# Patient Record
Sex: FEMALE | Race: BLACK OR AFRICAN AMERICAN | NOT HISPANIC OR LATINO | Employment: FULL TIME | URBAN - METROPOLITAN AREA
[De-identification: names, ages, dates, MRNs, and addresses within clinical notes are randomized per-mention and may not be internally consistent; named-entity substitution may affect disease eponyms.]

---

## 2022-04-11 ENCOUNTER — HISTORICAL (OUTPATIENT)
Dept: ADMINISTRATIVE | Facility: HOSPITAL | Age: 22
End: 2022-04-11

## 2022-04-29 VITALS — DIASTOLIC BLOOD PRESSURE: 70 MMHG | SYSTOLIC BLOOD PRESSURE: 112 MMHG | OXYGEN SATURATION: 97 %

## 2022-07-06 ENCOUNTER — HOSPITAL ENCOUNTER (EMERGENCY)
Facility: HOSPITAL | Age: 22
Discharge: HOME | End: 2022-07-07
Attending: EMERGENCY MEDICINE
Payer: COMMERCIAL

## 2022-07-06 DIAGNOSIS — R10.31 RIGHT LOWER QUADRANT ABDOMINAL PAIN: Primary | ICD-10-CM

## 2022-07-06 LAB
ALBUMIN SERPL-MCNC: 4.2 GM/DL (ref 3.5–5)
ALBUMIN/GLOB SERPL: 1.6 RATIO (ref 1.1–2)
ALP SERPL-CCNC: 84 UNIT/L (ref 40–150)
ALT SERPL-CCNC: 16 UNIT/L (ref 0–55)
AST SERPL-CCNC: 20 UNIT/L (ref 5–34)
B-HCG UR QL: NEGATIVE
BASOPHILS # BLD AUTO: 0.06 X10(3)/MCL (ref 0–0.2)
BASOPHILS NFR BLD AUTO: 0.5 %
BILIRUBIN DIRECT+TOT PNL SERPL-MCNC: 0.3 MG/DL
BUN SERPL-MCNC: 8 MG/DL (ref 7–18.7)
CALCIUM SERPL-MCNC: 9.6 MG/DL (ref 8.4–10.2)
CHLORIDE SERPL-SCNC: 107 MMOL/L (ref 98–107)
CO2 SERPL-SCNC: 25 MMOL/L (ref 22–29)
CREAT SERPL-MCNC: 0.89 MG/DL (ref 0.55–1.02)
CTP QC/QA: YES
EOSINOPHIL # BLD AUTO: 0.29 X10(3)/MCL (ref 0–0.9)
EOSINOPHIL NFR BLD AUTO: 2.3 %
ERYTHROCYTE [DISTWIDTH] IN BLOOD BY AUTOMATED COUNT: 12.1 % (ref 11.5–17)
GLOBULIN SER-MCNC: 2.7 GM/DL (ref 2.4–3.5)
GLUCOSE SERPL-MCNC: 120 MG/DL (ref 74–100)
HCT VFR BLD AUTO: 40.8 % (ref 37–47)
HGB BLD-MCNC: 13.1 GM/DL (ref 12–16)
IMM GRANULOCYTES # BLD AUTO: 0.05 X10(3)/MCL (ref 0–0.04)
IMM GRANULOCYTES NFR BLD AUTO: 0.4 %
LIPASE SERPL-CCNC: 11 U/L
LYMPHOCYTES # BLD AUTO: 2.12 X10(3)/MCL (ref 0.6–4.6)
LYMPHOCYTES NFR BLD AUTO: 16.8 %
MCH RBC QN AUTO: 29 PG (ref 27–31)
MCHC RBC AUTO-ENTMCNC: 32.1 MG/DL (ref 33–36)
MCV RBC AUTO: 90.5 FL (ref 80–94)
MONOCYTES # BLD AUTO: 0.9 X10(3)/MCL (ref 0.1–1.3)
MONOCYTES NFR BLD AUTO: 7.1 %
NEUTROPHILS # BLD AUTO: 9.2 X10(3)/MCL (ref 2.1–9.2)
NEUTROPHILS NFR BLD AUTO: 72.9 %
NRBC BLD AUTO-RTO: 0 %
PLATELET # BLD AUTO: 320 X10(3)/MCL (ref 130–400)
PMV BLD AUTO: 10.4 FL (ref 7.4–10.4)
POTASSIUM SERPL-SCNC: 4 MMOL/L (ref 3.5–5.1)
PROT SERPL-MCNC: 6.9 GM/DL (ref 6.4–8.3)
RBC # BLD AUTO: 4.51 X10(6)/MCL (ref 4.2–5.4)
SODIUM SERPL-SCNC: 140 MMOL/L (ref 136–145)
WBC # SPEC AUTO: 12.6 X10(3)/MCL (ref 4.5–11.5)

## 2022-07-06 PROCEDURE — 85025 COMPLETE CBC W/AUTO DIFF WBC: CPT | Performed by: EMERGENCY MEDICINE

## 2022-07-06 PROCEDURE — 96374 THER/PROPH/DIAG INJ IV PUSH: CPT | Mod: 59

## 2022-07-06 PROCEDURE — 80053 COMPREHEN METABOLIC PANEL: CPT | Performed by: EMERGENCY MEDICINE

## 2022-07-06 PROCEDURE — 81001 URINALYSIS AUTO W/SCOPE: CPT | Performed by: EMERGENCY MEDICINE

## 2022-07-06 PROCEDURE — 99285 EMERGENCY DEPT VISIT HI MDM: CPT | Mod: 25

## 2022-07-06 PROCEDURE — 25000003 PHARM REV CODE 250: Performed by: EMERGENCY MEDICINE

## 2022-07-06 PROCEDURE — 36415 COLL VENOUS BLD VENIPUNCTURE: CPT | Performed by: EMERGENCY MEDICINE

## 2022-07-06 PROCEDURE — 81025 URINE PREGNANCY TEST: CPT | Performed by: PHYSICIAN ASSISTANT

## 2022-07-06 PROCEDURE — 81025 URINE PREGNANCY TEST: CPT | Performed by: EMERGENCY MEDICINE

## 2022-07-06 PROCEDURE — 81003 URINALYSIS AUTO W/O SCOPE: CPT | Performed by: EMERGENCY MEDICINE

## 2022-07-06 PROCEDURE — 63600175 PHARM REV CODE 636 W HCPCS: Performed by: PHYSICIAN ASSISTANT

## 2022-07-06 PROCEDURE — 83690 ASSAY OF LIPASE: CPT | Performed by: EMERGENCY MEDICINE

## 2022-07-06 PROCEDURE — 25500020 PHARM REV CODE 255: Performed by: EMERGENCY MEDICINE

## 2022-07-06 RX ORDER — KETOROLAC TROMETHAMINE 30 MG/ML
30 INJECTION, SOLUTION INTRAMUSCULAR; INTRAVENOUS
Status: COMPLETED | OUTPATIENT
Start: 2022-07-06 | End: 2022-07-06

## 2022-07-06 RX ORDER — SODIUM CHLORIDE 9 MG/ML
1000 INJECTION, SOLUTION INTRAVENOUS
Status: COMPLETED | OUTPATIENT
Start: 2022-07-06 | End: 2022-07-06

## 2022-07-06 RX ORDER — SODIUM CHLORIDE 0.9 % (FLUSH) 0.9 %
10 SYRINGE (ML) INJECTION
Status: DISCONTINUED | OUTPATIENT
Start: 2022-07-06 | End: 2022-07-07 | Stop reason: HOSPADM

## 2022-07-06 RX ADMIN — KETOROLAC TROMETHAMINE 30 MG: 30 INJECTION, SOLUTION INTRAMUSCULAR at 09:07

## 2022-07-06 RX ADMIN — IOPAMIDOL 100 ML: 755 INJECTION, SOLUTION INTRAVENOUS at 10:07

## 2022-07-06 RX ADMIN — SODIUM CHLORIDE 1000 ML: 9 INJECTION, SOLUTION INTRAVENOUS at 10:07

## 2022-07-07 VITALS
TEMPERATURE: 97 F | RESPIRATION RATE: 16 BRPM | OXYGEN SATURATION: 98 % | HEART RATE: 80 BPM | SYSTOLIC BLOOD PRESSURE: 117 MMHG | HEIGHT: 62 IN | DIASTOLIC BLOOD PRESSURE: 70 MMHG

## 2022-07-07 LAB
APPEARANCE UR: CLEAR
B-HCG SERPL QL: NEGATIVE
BACTERIA #/AREA URNS AUTO: ABNORMAL /HPF
BILIRUB UR QL STRIP.AUTO: NEGATIVE MG/DL
COLOR UR AUTO: YELLOW
GLUCOSE UR QL STRIP.AUTO: NEGATIVE MG/DL
KETONES UR QL STRIP.AUTO: NEGATIVE MG/DL
LEUKOCYTE ESTERASE UR QL STRIP.AUTO: NEGATIVE UNIT/L
NITRITE UR QL STRIP.AUTO: NEGATIVE
PH UR STRIP.AUTO: 7.5 [PH]
PROT UR QL STRIP.AUTO: ABNORMAL MG/DL
RBC #/AREA URNS AUTO: 10 /HPF
RBC #/AREA URNS AUTO: ABNORMAL /HPF
RBC UR QL AUTO: ABNORMAL UNIT/L
SP GR UR STRIP.AUTO: >=1.04 (ref 1–1.03)
SQUAMOUS #/AREA URNS AUTO: 9 /HPF
SQUAMOUS #/AREA URNS AUTO: ABNORMAL /HPF
UROBILINOGEN UR STRIP-ACNC: 1 MG/DL
WBC #/AREA URNS AUTO: 5 /HPF
WBC #/AREA URNS AUTO: ABNORMAL /HPF

## 2022-07-07 RX ORDER — ONDANSETRON 4 MG/1
4 TABLET, ORALLY DISINTEGRATING ORAL EVERY 6 HOURS PRN
Qty: 20 TABLET | Refills: 0 | Status: SHIPPED | OUTPATIENT
Start: 2022-07-07 | End: 2022-07-07 | Stop reason: SDUPTHER

## 2022-07-07 RX ORDER — ONDANSETRON 4 MG/1
4 TABLET, ORALLY DISINTEGRATING ORAL EVERY 6 HOURS PRN
Qty: 20 TABLET | Refills: 0 | Status: SHIPPED | OUTPATIENT
Start: 2022-07-07 | End: 2022-07-12

## 2022-07-07 NOTE — ED PROVIDER NOTES
Encounter Date: 7/6/2022    SCRIBE #1 NOTE: I, Rosa Maria Alicia, am scribing for, and in the presence of,  Yahaira Hebert. I have scribed the entire note.       History     Chief Complaint   Patient presents with    Abdominal Pain     C/o rlq abd pain times 2 hours.  Denies dysuria. N/v times 1     21 y/o female presents to the ED complaining of right lower abdominal pain that started several hours ago. She also c/o nausea and vomiting. Pt denies any fevers, diarrhea, dysuria, or hematuria.  She is currently on her menstrual cycle.  She has not taken anything for the pain.    The history is provided by the patient.   Abdominal Pain  The current episode started several hours ago. The abdominal pain radiates to the RLQ and right flank. The other symptoms of the illness include nausea and vomiting. The other symptoms of the illness do not include fever, shortness of breath, diarrhea or dysuria.   The patient states that she believes she is currently not pregnant. Symptoms associated with the illness do not include constipation, hematuria, frequency or back pain.     Review of patient's allergies indicates:  No Known Allergies  History reviewed. No pertinent past medical history.  History reviewed. No pertinent surgical history.  No family history on file.     Review of Systems   Constitutional: Negative for fever.   HENT: Negative for rhinorrhea.    Respiratory: Negative for cough and shortness of breath.    Cardiovascular: Negative for chest pain.   Gastrointestinal: Positive for abdominal pain, nausea and vomiting. Negative for blood in stool, constipation and diarrhea.   Genitourinary: Negative for difficulty urinating, dysuria, frequency and hematuria.   Musculoskeletal: Negative for back pain and neck pain.   Skin: Negative for rash.   Neurological: Negative for weakness, numbness and headaches.       Physical Exam     Initial Vitals [07/06/22 1945]   BP Pulse Resp Temp SpO2   128/74 72 16 97 °F (36.1 °C) 98 %      MAP        --         Physical Exam    Nursing note and vitals reviewed.  Constitutional: She appears well-developed and well-nourished. She is not diaphoretic. No distress.   Limited exam in wheelchair as there are currently no beds in the ED due to boarding   HENT:   Head: Normocephalic and atraumatic.   Mouth/Throat: Oropharynx is clear and moist.   Eyes: Conjunctivae and EOM are normal.   Neck: Neck supple.   Normal range of motion.  Cardiovascular: Normal rate and regular rhythm.   Pulmonary/Chest: No respiratory distress.   Abdominal: Abdomen is soft. Bowel sounds are normal. She exhibits no distension. There is abdominal tenderness (RLQ, right flank). There is no rebound and no guarding.   Musculoskeletal:         General: No edema. Normal range of motion.      Cervical back: Normal range of motion and neck supple.      Lumbar back: Normal range of motion.     Neurological: She is alert and oriented to person, place, and time. She has normal strength.   Skin: Skin is warm, dry and intact. Capillary refill takes less than 2 seconds.   Psychiatric: She has a normal mood and affect.         ED Course   Procedures  Labs Reviewed   URINALYSIS, REFLEX TO URINE CULTURE - Abnormal; Notable for the following components:       Result Value    Specific Gravity, UA >=1.040 (*)     Protein, UA Trace (*)     Blood, UA Trace (*)     All other components within normal limits   COMPREHENSIVE METABOLIC PANEL - Abnormal; Notable for the following components:    Glucose Level 120 (*)     All other components within normal limits   CBC WITH DIFFERENTIAL - Abnormal; Notable for the following components:    WBC 12.6 (*)     MCHC 32.1 (*)     IG# 0.05 (*)     All other components within normal limits   URINALYSIS, MICROSCOPIC - Abnormal; Notable for the following components:    RBC, UA 10 (*)     Squamous Epithelial Cells, UA 9 (*)     Squamous Epithelial Cells, UA 5-10 (*)     All other components within normal limits   LIPASE - Normal    PREGNANCY TEST, URINE RAPID - Normal   CBC W/ AUTO DIFFERENTIAL    Narrative:     The following orders were created for panel order CBC auto differential.  Procedure                               Abnormality         Status                     ---------                               -----------         ------                     CBC with Differential[[id number]]        Abnormal            Final result                 Please view results for these tests on the individual orders.   POCT URINE PREGNANCY          Imaging Results          CT Abdomen Pelvis With Contrast (Final result)  Result time 07/06/22 22:28:57    Final result by Daniele Wagoner MD (07/06/22 22:28:57)                 Impression:      No acute abnormalities are seen      Electronically signed by: Daniele Wagoner MD  Date:    07/06/2022  Time:    22:28             Narrative:    EXAMINATION:  CT ABDOMEN PELVIS WITH CONTRAST    CLINICAL HISTORY:  RLQ abdominal pain (Age >= 14y);    TECHNIQUE:  Low dose axial images, sagittal and coronal reformations were obtained from the lung bases to the pubic symphysis following the IV administration of 100 mL of Isovue 370 no oral contrast is demonstrated.    Automatic exposure control (AEC) was utilized for dose reduction.    Dose: 437 mGycm    COMPARISON:  None.    FINDINGS:  Lung bases are clear.  Liver appears normal.  Spleen appears normal.  Pancreas appears normal.  Biliary system appears normal.  The adrenals are not enlarged.  Kidneys appear normal.  Aorta shows no evidence of an aneurysm.  Uterus appears normal.  The appendix appears normal.                                 Medications   ketorolac injection 30 mg (30 mg Intravenous Given 7/6/22 2130)   iopamidoL (ISOVUE-370) injection 100 mL (100 mLs Intravenous Given 7/6/22 2213)   0.9%  NaCl infusion (1,000 mLs Intravenous New Bag 7/6/22 2250)     Medical Decision Making:   Initial Assessment:   22-year-old female, UPT negative, here for right lower  quadrant/right flank pain that began earlier tonight.  She had 1 episode of nausea with vomiting, no changes in bowel movement.  No fever or urinary symptoms.  She is tender in the right lower quadrant and right flank on limited abdominal exam.  Labs remarkable for leukocytosis.  I will obtain CT scan of the abdomen and pelvis with contrast to further assess for appendicitis and other etiologies of her pain.  Reassess  Differential Diagnosis:   Renal stone, appendicitis, gastritis, biliary colic, and others  Clinical Tests:   Lab Tests: Ordered and Reviewed  Radiological Study: Ordered and Reviewed          Scribe Attestation:   Scribe #1: I performed the above scribed service and the documentation accurately describes the services I performed. I attest to the accuracy of the note.    Attending Attestation:           Physician Attestation for Scribe:  Physician Attestation Statement for Scribe #1: I, reviewed documentation, as scribed by Rosa Maria Alicia in my presence, and it is both accurate and complete.             ED Course as of 07/07/22 0629   Thu Jul 07, 2022   0027 Patient is resting comfortably, feeling improved after IV fluids and Toradol.  UA is still pending, CT scan of the abdomen and pelvis negative for acute appendicitis or other intra-abdominal or pelvic pathology.  She is asking for discharge to home.  She is tolerating fluids and verbalizes understanding of the return precautions along with the need to follow-up with her primary care provider in 2-3 days for re-evaluation.  If UA is concerning for infection I will contact her and send a prescription for antibiotic treatment as warranted.  She is aware of this and amenable to the plan.  She is discharged in stable condition.  OTC pain control discussed. [RB]      ED Course User Index  [RB] Yahaira Hebert MD             Clinical Impression:   Final diagnoses:  [R10.31] Right lower quadrant abdominal pain (Primary)          ED Disposition Condition     Discharge Stable        ED Prescriptions     Medication Sig Dispense Start Date End Date Auth. Provider    ondansetron (ZOFRAN-ODT) 4 MG TbDL  (Status: Discontinued) Take 1 tablet (4 mg total) by mouth every 6 (six) hours as needed (nausea). 20 tablet 7/7/2022 7/7/2022 Yahaira Hebert MD    ondansetron (ZOFRAN-ODT) 4 MG TbDL Take 1 tablet (4 mg total) by mouth every 6 (six) hours as needed (nausea). 20 tablet 7/7/2022 7/12/2022 Yahaira Hebert MD        Follow-up Information     Follow up With Specialties Details Why Contact Info    [facility] - Emergency Dept Emergency Medicine  As needed, If symptoms worsen [address] [facility] [id number] [phone]        See your primary care physician in 2-3 days for re-evaluation           Yahaira Hebert MD  07/07/22 0629

## 2023-01-17 LAB
% NEUTROPHILS (OHS): 59 (ref 44–68)
25(OH)D3+25(OH)D2 SERPL-MCNC: 13 NG/ML (ref 30–100)
ALBUMIN SERPL BCP-MCNC: 4.2 G/DL (ref 3.5–5)
ALBUMIN/GLOB SERPL ELPH: 1.7 {RATIO} (ref 0.8–2)
ALP SERPL-CCNC: 73 U/L (ref 38–126)
ALT SERPL-CCNC: 16 U/L (ref 13–69)
ANION GAP SERPL CALC-SCNC: 5 MMOL/L (ref 8–18)
AST SERPL-CCNC: 24 U/L (ref 15–46)
BASOPHILS NFR BLD: 0.05 K/UL (ref 0–0.2)
BASOPHILS NFR BLD: 0.7 % (ref 0–2)
BILIRUB SERPL-MCNC: 0.3 MG/DL (ref 0.2–1.3)
BILIRUB UR QL STRIP.AUTO: NEGATIVE
CALCIUM SERPL-MCNC: 9.1 MG/DL (ref 8.6–10.3)
CHLORIDE: 105 MMOL/L (ref 98–107)
CHOLESTEROL, TOTAL: 162 (ref 25–200)
CLARITY UR: ABNORMAL
CO2 SERPL-SCNC: 29 MMOL/L (ref 20–30)
COLOR URINE: YELLOW
CREATINE, SERUM: 0.7 (ref 0.52–1.04)
EGFR: 113.4
EOS%, CSF: 4.6 % (ref 0–4)
EOSINOPHIL NFR BLD: 0.33 K/UL (ref 0–0.5)
ERYTHROCYTE [DISTWIDTH] IN BLOOD BY AUTOMATED COUNT: 12.5 % (ref 11–16)
EXT URINE KETONES: ABNORMAL
GLOBULIN SER-MCNC: 2.4 GM/DL (ref 2–3.5)
GLUCOSE UR-MCNC: 0 MG/DL
GLUCOSE: 94 MG/DL (ref 74–106)
HBA1C MFR BLD: 4.7 % (ref 4–6)
HCT VFR BLD AUTO: 42.7 % (ref 37–47)
HDLC SERPL-MCNC: 45 MG/DL (ref 23–92)
HDLC SERPL: 3.6 %
HGB BLD-MCNC: 13.4 G/DL (ref 11.5–16)
HGB UR QL STRIP: NEGATIVE
IMM GRANULOCYTES # BLD AUTO: 0.01 X10(3)/MCL (ref 0–0.2)
IMM GRANULOCYTES NFR BLD AUTO: 0.1 % (ref 0–2)
LDL/HDL RATIO: 2.4
LDLC SERPL CALC-MCNC: 109.4 MG/DL (ref 0–100)
LEUKOCYTE ESTERASE UR QL STRIP.AUTO: ABNORMAL
LYMPH #: 1.93 K/UL (ref 1–4)
LYMPH%: 26.7 % (ref 25–44)
MCH RBC QN AUTO: 28.8 PG (ref 27–32)
MCHC RBC AUTO-ENTMCNC: 31.4 G/DL (ref 32–36)
MCV RBC AUTO: 91.6 FL (ref 80–100)
MONOCYTES %: 8.9 (ref 0–7)
MONOCYTES NFR BLD MANUAL: 1 % (ref 0–2)
MPC BLD CALC-MCNC: 11.5 FL (ref 6–11)
NEUTROPHILS NFR BLD: 4.26 % (ref 2–7.5)
NITRITE UR QL STRIP: NEGATIVE
PH UR STRIP: 6 [PH]
PLATELET # BLD AUTO: 282 K/UL (ref 150–500)
POTASSIUM: 5.1 MMOL/L (ref 3.5–5.1)
PROT SERPL-MCNC: 15 G/DL
PROT SERPL-MCNC: 6.6 G/DL (ref 6.3–8.2)
RBC # BLD AUTO: 4.66 M/UL (ref 3.8–5.8)
SODIUM: 138 MMOL/L (ref 137–145)
SP GR UR STRIP: 1.02 (ref 1–1.03)
TRIGL SERPL-MCNC: 38 MG/DL (ref 10–150)
TSH SERPL DL<=0.005 MIU/L-ACNC: 1.81 UIU/ML (ref 0.47–4.68)
UREA NITROGEN (BUN): 12 MG/DL (ref 7–25)
UROBILINOGEN UR STRIP-ACNC: NORMAL MG/DL
VLDLC SERPL-MCNC: 8 MG/DL (ref 8–18)
WBC # BLD AUTO: 7.2 K/UL (ref 4–10)

## 2023-12-11 LAB
% NEUTROPHILS (OHS): 57.3 (ref 44–68)
25(OH)D3+25(OH)D2 SERPL-MCNC: 13 NG/ML (ref 30–100)
ALBUMIN SERPL BCP-MCNC: 4.3 G/DL (ref 3.5–5)
ALBUMIN/GLOB SERPL ELPH: 1.7 {RATIO} (ref 0.8–2)
ALP SERPL-CCNC: 62 U/L (ref 38–126)
ALT SERPL-CCNC: 17 U/L (ref 13–69)
ANION GAP SERPL CALC-SCNC: 11 MMOL/L (ref 8–18)
AST SERPL-CCNC: 28 U/L (ref 15–46)
BASOPHILS NFR BLD: 0.03 K/UL (ref 0–0.2)
BASOPHILS NFR BLD: 0.6 % (ref 0–2)
BILIRUB SERPL-MCNC: 0.5 MG/DL (ref 0.2–1.3)
BILIRUB UR QL STRIP.AUTO: NEGATIVE
CALCIUM SERPL-MCNC: 9.3 MG/DL (ref 8.6–10.3)
CHLORIDE: 105 MMOL/L (ref 98–107)
CHOLESTEROL, TOTAL: 218 (ref 25–200)
CLARITY UR: ABNORMAL
CO2 SERPL-SCNC: 25 MMOL/L (ref 20–30)
COLOR URINE: YELLOW
CREATINE, SERUM: 0.7 (ref 0.52–1.04)
EGFR: 112.3
EOS%, CSF: 1.8 % (ref 0–4)
EOSINOPHIL NFR BLD: 0.09 K/UL (ref 0–0.5)
ERYTHROCYTE [DISTWIDTH] IN BLOOD BY AUTOMATED COUNT: 12.5 % (ref 11–16)
EXT URINE KETONES: ABNORMAL
FOLATE SERPL-MCNC: 8 NG/ML (ref 2.8–20)
GLOBULIN SER-MCNC: 2.5 GM/DL (ref 2–3.5)
GLUCOSE UR-MCNC: 0 MG/DL
GLUCOSE: 95 MG/DL (ref 74–106)
HBA1C MFR BLD: 4.6 % (ref 4–6)
HCT VFR BLD AUTO: 41.4 % (ref 37–47)
HDLC SERPL-MCNC: 59 MG/DL (ref 23–92)
HDLC SERPL: 3.7 %
HGB BLD-MCNC: 13 G/DL (ref 11.5–16)
HGB UR QL STRIP: NEGATIVE
IMM GRANULOCYTES # BLD AUTO: 0.01 X10(3)/MCL (ref 0–0.2)
IMM GRANULOCYTES NFR BLD AUTO: 0.2 % (ref 0–2)
LDL/HDL RATIO: 2.3
LDLC SERPL CALC-MCNC: 134.2 MG/DL (ref 0–100)
LEUKOCYTE ESTERASE UR QL STRIP.AUTO: ABNORMAL
LYMPH #: 1.54 K/UL (ref 1–4)
LYMPH%: 31.2 % (ref 25–44)
MCH RBC QN AUTO: 28.8 PG (ref 27–32)
MCHC RBC AUTO-ENTMCNC: 31.4 G/DL (ref 32–36)
MCV RBC AUTO: 91.8 FL (ref 80–100)
MONOCYTES %: 8.9 (ref 0–7)
MONOCYTES NFR BLD MANUAL: 0 % (ref 0–2)
MPC BLD CALC-MCNC: 11.3 FL (ref 6–11)
NEUTROPHILS NFR BLD: 2.82 % (ref 2–7.5)
NITRITE UR QL STRIP: NEGATIVE
PH UR STRIP: 5 [PH]
PLATELET # BLD AUTO: 287 K/UL (ref 150–500)
POTASSIUM: 4.2 MMOL/L (ref 3.5–5.1)
PROT SERPL-MCNC: 6.8 G/DL (ref 6.3–8.2)
PROT UR-MCNC: 30 MG/DL
RBC # BLD AUTO: 4.51 M/UL (ref 3.8–5.8)
SODIUM: 137 MMOL/L (ref 137–145)
SP GR UR STRIP: 1.02 (ref 1–1.03)
TRIGL SERPL-MCNC: 124 MG/DL (ref 10–150)
TSH SERPL DL<=0.005 MIU/L-ACNC: 1.99 UIU/ML (ref 0.47–4.68)
UREA NITROGEN (BUN): 12 MG/DL (ref 7–25)
UROBILINOGEN UR STRIP-ACNC: NORMAL MG/DL
VLDLC SERPL-MCNC: 25 MG/DL (ref 8–18)
WBC # BLD AUTO: 4.9 K/UL (ref 4–10)
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
## 2024-03-26 ENCOUNTER — OFFICE VISIT (OUTPATIENT)
Dept: FAMILY MEDICINE | Facility: CLINIC | Age: 24
End: 2024-03-26
Payer: COMMERCIAL

## 2024-03-26 VITALS
HEIGHT: 62 IN | HEART RATE: 96 BPM | BODY MASS INDEX: 38.16 KG/M2 | DIASTOLIC BLOOD PRESSURE: 72 MMHG | OXYGEN SATURATION: 99 % | WEIGHT: 207.38 LBS | SYSTOLIC BLOOD PRESSURE: 108 MMHG

## 2024-03-26 DIAGNOSIS — E55.9 VITAMIN D DEFICIENCY: ICD-10-CM

## 2024-03-26 DIAGNOSIS — Z11.59 NEED FOR HEPATITIS C SCREENING TEST: ICD-10-CM

## 2024-03-26 DIAGNOSIS — Z11.4 SCREENING FOR HIV (HUMAN IMMUNODEFICIENCY VIRUS): ICD-10-CM

## 2024-03-26 DIAGNOSIS — Z11.8 SCREENING FOR CHLAMYDIAL DISEASE: ICD-10-CM

## 2024-03-26 DIAGNOSIS — K21.9 GASTROESOPHAGEAL REFLUX DISEASE, UNSPECIFIED WHETHER ESOPHAGITIS PRESENT: ICD-10-CM

## 2024-03-26 DIAGNOSIS — M54.50 CHRONIC BILATERAL LOW BACK PAIN, UNSPECIFIED WHETHER SCIATICA PRESENT: ICD-10-CM

## 2024-03-26 DIAGNOSIS — Z00.00 ANNUAL PHYSICAL EXAM: ICD-10-CM

## 2024-03-26 DIAGNOSIS — Z13.1 SCREENING FOR DIABETES MELLITUS: ICD-10-CM

## 2024-03-26 DIAGNOSIS — G89.29 CHRONIC BILATERAL LOW BACK PAIN, UNSPECIFIED WHETHER SCIATICA PRESENT: ICD-10-CM

## 2024-03-26 DIAGNOSIS — Z76.89 ENCOUNTER TO ESTABLISH CARE: Primary | ICD-10-CM

## 2024-03-26 PROBLEM — E66.01 SEVERE OBESITY: Status: ACTIVE | Noted: 2024-03-26

## 2024-03-26 PROCEDURE — 3008F BODY MASS INDEX DOCD: CPT | Mod: CPTII,,, | Performed by: NURSE PRACTITIONER

## 2024-03-26 PROCEDURE — 3074F SYST BP LT 130 MM HG: CPT | Mod: CPTII,,, | Performed by: NURSE PRACTITIONER

## 2024-03-26 PROCEDURE — 99203 OFFICE O/P NEW LOW 30 MIN: CPT | Mod: ,,, | Performed by: NURSE PRACTITIONER

## 2024-03-26 PROCEDURE — 3078F DIAST BP <80 MM HG: CPT | Mod: CPTII,,, | Performed by: NURSE PRACTITIONER

## 2024-03-26 PROCEDURE — 1159F MED LIST DOCD IN RCRD: CPT | Mod: CPTII,,, | Performed by: NURSE PRACTITIONER

## 2024-03-26 PROCEDURE — 1160F RVW MEDS BY RX/DR IN RCRD: CPT | Mod: CPTII,,, | Performed by: NURSE PRACTITIONER

## 2024-03-26 RX ORDER — PANTOPRAZOLE SODIUM 40 MG/1
40 TABLET, DELAYED RELEASE ORAL DAILY
Qty: 30 TABLET | Refills: 11 | Status: SHIPPED | OUTPATIENT
Start: 2024-03-26 | End: 2025-03-26

## 2024-03-26 RX ORDER — NORGESTIMATE AND ETHINYL ESTRADIOL 0.25-0.035
1 KIT ORAL
COMMUNITY
Start: 2024-03-01

## 2024-03-26 NOTE — LETTER
AUTHORIZATION FOR RELEASE OF   CONFIDENTIAL INFORMATION    Dear FRANDY Leos,    We are seeing Rudy Caruso, date of birth 2000, in the clinic at [facility]. Abril Clay FNP is the patient's PCP. Rudy Caruso has an outstanding lab/procedure at the time we reviewed her chart. In order to help keep her health information updated, she has authorized us to request the following medical record(s):        (  )  MAMMOGRAM                                      (  )  COLONOSCOPY      ( X )  PAP SMEAR                                        (  X)  LAB RESULTS     (  )  DEXA SCAN                                          (  )  EYE EXAM            (  )  FOOT EXAM                                          (  )  ENTIRE RECORD     (  )  OUTSIDE IMMUNIZATIONS                 ( x ) LAST 5 YEARS         Please fax records to [facility], [place], Kathryn F., FNP, [phone]     If you have any questions, please contact [phone]          Patient Name: Rudy Caruso  : 2000  Patient Phone #: [phone]

## 2024-03-26 NOTE — ASSESSMENT & PLAN NOTE
L-spine x-ray ordered to be completed in 3 months prior to wellness, will discuss results at wellness visit.

## 2024-03-26 NOTE — LETTER
AUTHORIZATION FOR RELEASE OF   CONFIDENTIAL INFORMATION    Dear Dr. Saldivar,    We are seeing Rudy Caruso, date of birth 2000, in the clinic at [facility]. Abril Clay FNP is the patient's PCP. Rudy Caruso has an outstanding lab/procedure at the time we reviewed her chart. In order to help keep her health information updated, she has authorized us to request the following medical record(s):        (  )  MAMMOGRAM                                      (  )  COLONOSCOPY      ( X )  PAP SMEAR                                          (  )  OUTSIDE LAB RESULTS     (  )  DEXA SCAN                                          (  )  EYE EXAM            (  )  FOOT EXAM                                          (  )  ENTIRE RECORD     (  )  OUTSIDE IMMUNIZATIONS                 (  )  _______________         Please fax records to Abril Clay FNP, [phone]     If you have any questions, please contact Geoff at [phone].           Patient Name: Rudy Caruso  : 2000  Patient Phone #: [phone]

## 2024-06-18 ENCOUNTER — TELEPHONE (OUTPATIENT)
Dept: FAMILY MEDICINE | Facility: CLINIC | Age: 24
End: 2024-06-18
Payer: COMMERCIAL

## 2024-06-18 DIAGNOSIS — Z11.4 SCREENING FOR HIV (HUMAN IMMUNODEFICIENCY VIRUS): ICD-10-CM

## 2024-06-18 DIAGNOSIS — Z13.1 SCREENING FOR DIABETES MELLITUS: ICD-10-CM

## 2024-06-18 DIAGNOSIS — Z00.00 ANNUAL PHYSICAL EXAM: ICD-10-CM

## 2024-06-18 DIAGNOSIS — M54.50 CHRONIC BILATERAL LOW BACK PAIN, UNSPECIFIED WHETHER SCIATICA PRESENT: Primary | ICD-10-CM

## 2024-06-18 DIAGNOSIS — Z11.8 SCREENING FOR CHLAMYDIAL DISEASE: ICD-10-CM

## 2024-06-18 DIAGNOSIS — G89.29 CHRONIC BILATERAL LOW BACK PAIN, UNSPECIFIED WHETHER SCIATICA PRESENT: Primary | ICD-10-CM

## 2024-06-18 DIAGNOSIS — Z11.59 NEED FOR HEPATITIS C SCREENING TEST: ICD-10-CM

## 2024-06-18 DIAGNOSIS — E55.9 VITAMIN D DEFICIENCY: ICD-10-CM

## 2024-06-18 NOTE — TELEPHONE ENCOUNTER
Are there any outstanding tasks in patient's chart?  Labs and xrays    2. Do we have outstanding/pending referrals?    n  3. Has the patient been seen in an ER, Urgent Care, or admitted since last visit?    n  4. Has patient seen any other health care providers since last visit?    n  5.  Has patient had any blood work or x-rays done since last visit?   Patient will complete labs and xray prior to visit

## 2024-06-25 ENCOUNTER — HOSPITAL ENCOUNTER (OUTPATIENT)
Dept: RADIOLOGY | Facility: HOSPITAL | Age: 24
Discharge: HOME | End: 2024-06-25
Attending: NURSE PRACTITIONER
Payer: COMMERCIAL

## 2024-06-25 ENCOUNTER — OFFICE VISIT (OUTPATIENT)
Dept: FAMILY MEDICINE | Facility: CLINIC | Age: 24
End: 2024-06-25
Payer: COMMERCIAL

## 2024-06-25 ENCOUNTER — LAB VISIT (OUTPATIENT)
Dept: LAB | Facility: HOSPITAL | Age: 24
End: 2024-06-25
Attending: NURSE PRACTITIONER
Payer: COMMERCIAL

## 2024-06-25 VITALS
SYSTOLIC BLOOD PRESSURE: 119 MMHG | WEIGHT: 205.81 LBS | OXYGEN SATURATION: 97 % | HEIGHT: 62 IN | BODY MASS INDEX: 37.87 KG/M2 | RESPIRATION RATE: 18 BRPM | TEMPERATURE: 98 F | HEART RATE: 88 BPM | DIASTOLIC BLOOD PRESSURE: 81 MMHG

## 2024-06-25 DIAGNOSIS — G89.29 CHRONIC BILATERAL LOW BACK PAIN, UNSPECIFIED WHETHER SCIATICA PRESENT: ICD-10-CM

## 2024-06-25 DIAGNOSIS — G47.19 EXCESSIVE DAYTIME SLEEPINESS: ICD-10-CM

## 2024-06-25 DIAGNOSIS — Z11.4 SCREENING FOR HIV (HUMAN IMMUNODEFICIENCY VIRUS): ICD-10-CM

## 2024-06-25 DIAGNOSIS — M54.50 CHRONIC BILATERAL LOW BACK PAIN, UNSPECIFIED WHETHER SCIATICA PRESENT: ICD-10-CM

## 2024-06-25 DIAGNOSIS — Z13.1 SCREENING FOR DIABETES MELLITUS: ICD-10-CM

## 2024-06-25 DIAGNOSIS — K21.9 GASTROESOPHAGEAL REFLUX DISEASE, UNSPECIFIED WHETHER ESOPHAGITIS PRESENT: ICD-10-CM

## 2024-06-25 DIAGNOSIS — Z00.00 ANNUAL PHYSICAL EXAM: ICD-10-CM

## 2024-06-25 DIAGNOSIS — Z00.00 ANNUAL PHYSICAL EXAM: Primary | ICD-10-CM

## 2024-06-25 DIAGNOSIS — E55.9 VITAMIN D DEFICIENCY: ICD-10-CM

## 2024-06-25 DIAGNOSIS — Z23 NEED FOR DIPHTHERIA-TETANUS-PERTUSSIS (TDAP) VACCINE: ICD-10-CM

## 2024-06-25 DIAGNOSIS — R06.83 SNORES: ICD-10-CM

## 2024-06-25 DIAGNOSIS — Z11.59 NEED FOR HEPATITIS C SCREENING TEST: ICD-10-CM

## 2024-06-25 LAB
25(OH)D3+25(OH)D2 SERPL-MCNC: 19 NG/ML (ref 30–80)
ALBUMIN SERPL-MCNC: 3.7 G/DL (ref 3.5–5)
ALBUMIN/GLOB SERPL: 1.2 RATIO (ref 1.1–2)
ALP SERPL-CCNC: 72 UNIT/L (ref 40–150)
ALT SERPL-CCNC: 13 UNIT/L (ref 0–55)
ANION GAP SERPL CALC-SCNC: 10 MEQ/L
AST SERPL-CCNC: 16 UNIT/L (ref 5–34)
BASOPHILS # BLD AUTO: 0.02 X10(3)/MCL
BASOPHILS NFR BLD AUTO: 0.3 %
BILIRUB SERPL-MCNC: 0.4 MG/DL
BUN SERPL-MCNC: 9.3 MG/DL (ref 7–18.7)
CALCIUM SERPL-MCNC: 9.3 MG/DL (ref 8.4–10.2)
CHLORIDE SERPL-SCNC: 105 MMOL/L (ref 98–107)
CHOLEST SERPL-MCNC: 198 MG/DL
CHOLEST/HDLC SERPL: 4 {RATIO} (ref 0–5)
CO2 SERPL-SCNC: 22 MMOL/L (ref 22–29)
CREAT SERPL-MCNC: 0.82 MG/DL (ref 0.55–1.02)
CREAT/UREA NIT SERPL: 11
EOSINOPHIL # BLD AUTO: 0.07 X10(3)/MCL (ref 0–0.9)
EOSINOPHIL NFR BLD AUTO: 1.1 %
ERYTHROCYTE [DISTWIDTH] IN BLOOD BY AUTOMATED COUNT: 12.3 % (ref 11.5–17)
EST. AVERAGE GLUCOSE BLD GHB EST-MCNC: 85.3 MG/DL
GFR SERPLBLD CREATININE-BSD FMLA CKD-EPI: >60 ML/MIN/1.73/M2
GLOBULIN SER-MCNC: 3.1 GM/DL (ref 2.4–3.5)
GLUCOSE SERPL-MCNC: 106 MG/DL (ref 74–100)
HBA1C MFR BLD: 4.6 %
HCT VFR BLD AUTO: 40.5 % (ref 37–47)
HDLC SERPL-MCNC: 53 MG/DL (ref 35–60)
HGB BLD-MCNC: 13 G/DL (ref 12–16)
IMM GRANULOCYTES # BLD AUTO: 0.02 X10(3)/MCL (ref 0–0.04)
IMM GRANULOCYTES NFR BLD AUTO: 0.3 %
LDLC SERPL CALC-MCNC: 120 MG/DL (ref 50–140)
LYMPHOCYTES # BLD AUTO: 1.6 X10(3)/MCL (ref 0.6–4.6)
LYMPHOCYTES NFR BLD AUTO: 25.9 %
MCH RBC QN AUTO: 28.2 PG (ref 27–31)
MCHC RBC AUTO-ENTMCNC: 32.1 G/DL (ref 33–36)
MCV RBC AUTO: 87.9 FL (ref 80–94)
MONOCYTES # BLD AUTO: 0.51 X10(3)/MCL (ref 0.1–1.3)
MONOCYTES NFR BLD AUTO: 8.3 %
NEUTROPHILS # BLD AUTO: 3.96 X10(3)/MCL (ref 2.1–9.2)
NEUTROPHILS NFR BLD AUTO: 64.1 %
NRBC BLD AUTO-RTO: 0 %
PLATELET # BLD AUTO: 280 X10(3)/MCL (ref 130–400)
PMV BLD AUTO: 10.8 FL (ref 7.4–10.4)
POTASSIUM SERPL-SCNC: 4.4 MMOL/L (ref 3.5–5.1)
PROT SERPL-MCNC: 6.8 GM/DL (ref 6.4–8.3)
RBC # BLD AUTO: 4.61 X10(6)/MCL (ref 4.2–5.4)
SODIUM SERPL-SCNC: 137 MMOL/L (ref 136–145)
TRIGL SERPL-MCNC: 123 MG/DL (ref 37–140)
TSH SERPL-ACNC: 2.53 UIU/ML (ref 0.35–4.94)
VLDLC SERPL CALC-MCNC: 25 MG/DL
WBC # BLD AUTO: 6.18 X10(3)/MCL (ref 4.5–11.5)

## 2024-06-25 PROCEDURE — 3008F BODY MASS INDEX DOCD: CPT | Mod: CPTII,,, | Performed by: NURSE PRACTITIONER

## 2024-06-25 PROCEDURE — 82306 VITAMIN D 25 HYDROXY: CPT

## 2024-06-25 PROCEDURE — 1160F RVW MEDS BY RX/DR IN RCRD: CPT | Mod: CPTII,,, | Performed by: NURSE PRACTITIONER

## 2024-06-25 PROCEDURE — 86803 HEPATITIS C AB TEST: CPT

## 2024-06-25 PROCEDURE — 84443 ASSAY THYROID STIM HORMONE: CPT

## 2024-06-25 PROCEDURE — 36415 COLL VENOUS BLD VENIPUNCTURE: CPT

## 2024-06-25 PROCEDURE — 90471 IMMUNIZATION ADMIN: CPT | Mod: ,,, | Performed by: NURSE PRACTITIONER

## 2024-06-25 PROCEDURE — 99395 PREV VISIT EST AGE 18-39: CPT | Mod: 25,,, | Performed by: NURSE PRACTITIONER

## 2024-06-25 PROCEDURE — 83036 HEMOGLOBIN GLYCOSYLATED A1C: CPT

## 2024-06-25 PROCEDURE — 90715 TDAP VACCINE 7 YRS/> IM: CPT | Mod: ,,, | Performed by: NURSE PRACTITIONER

## 2024-06-25 PROCEDURE — 3044F HG A1C LEVEL LT 7.0%: CPT | Mod: CPTII,,, | Performed by: NURSE PRACTITIONER

## 2024-06-25 PROCEDURE — 72110 X-RAY EXAM L-2 SPINE 4/>VWS: CPT | Mod: TC

## 2024-06-25 PROCEDURE — 80053 COMPREHEN METABOLIC PANEL: CPT

## 2024-06-25 PROCEDURE — 85025 COMPLETE CBC W/AUTO DIFF WBC: CPT

## 2024-06-25 PROCEDURE — 80061 LIPID PANEL: CPT

## 2024-06-25 PROCEDURE — 3074F SYST BP LT 130 MM HG: CPT | Mod: CPTII,,, | Performed by: NURSE PRACTITIONER

## 2024-06-25 PROCEDURE — 3079F DIAST BP 80-89 MM HG: CPT | Mod: CPTII,,, | Performed by: NURSE PRACTITIONER

## 2024-06-25 PROCEDURE — 1159F MED LIST DOCD IN RCRD: CPT | Mod: CPTII,,, | Performed by: NURSE PRACTITIONER

## 2024-06-25 PROCEDURE — 87389 HIV-1 AG W/HIV-1&-2 AB AG IA: CPT

## 2024-06-25 RX ORDER — ASPIRIN 325 MG
50000 TABLET, DELAYED RELEASE (ENTERIC COATED) ORAL
Qty: 12 CAPSULE | Refills: 3 | Status: SHIPPED | OUTPATIENT
Start: 2024-06-25

## 2024-06-25 NOTE — PROGRESS NOTES
Vitamin-D level low at 19, prescription sent for vitamin-D 95086 units weekly, will recheck level in 1 year.
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
## 2024-06-26 LAB
HCV AB SERPL QL IA: NONREACTIVE
HIV 1+2 AB+HIV1 P24 AG SERPL QL IA: NONREACTIVE

## 2024-07-19 ENCOUNTER — TELEPHONE (OUTPATIENT)
Dept: FAMILY MEDICINE | Facility: CLINIC | Age: 24
End: 2024-07-19
Payer: COMMERCIAL

## 2024-07-19 DIAGNOSIS — G47.33 OSA (OBSTRUCTIVE SLEEP APNEA): Primary | ICD-10-CM

## 2024-07-19 PROBLEM — R06.83 SNORES: Status: RESOLVED | Noted: 2024-06-25 | Resolved: 2024-07-19

## 2024-07-19 NOTE — TELEPHONE ENCOUNTER
Home sleep study shows mild sleep apnea.  Her oxygen levels dropped down to 81% at the lowest.  I do recommend treatment with CPAP, will prescribe auto PAP therapy through [facility], please notify patient.  Please schedule an appointment in 3 months for follow-up.

## 2024-09-30 ENCOUNTER — PATIENT MESSAGE (OUTPATIENT)
Dept: FAMILY MEDICINE | Facility: CLINIC | Age: 24
End: 2024-09-30
Payer: COMMERCIAL

## 2024-09-30 PROBLEM — Z00.00 ANNUAL PHYSICAL EXAM: Status: RESOLVED | Noted: 2024-06-25 | Resolved: 2024-09-30

## 2024-10-16 ENCOUNTER — PATIENT MESSAGE (OUTPATIENT)
Dept: FAMILY MEDICINE | Facility: CLINIC | Age: 24
End: 2024-10-16
Payer: COMMERCIAL

## 2024-10-23 ENCOUNTER — OFFICE VISIT (OUTPATIENT)
Dept: FAMILY MEDICINE | Facility: CLINIC | Age: 24
End: 2024-10-23
Payer: COMMERCIAL

## 2024-10-23 VITALS
DIASTOLIC BLOOD PRESSURE: 72 MMHG | WEIGHT: 209 LBS | OXYGEN SATURATION: 98 % | RESPIRATION RATE: 16 BRPM | HEART RATE: 98 BPM | BODY MASS INDEX: 38.46 KG/M2 | HEIGHT: 62 IN | TEMPERATURE: 98 F | SYSTOLIC BLOOD PRESSURE: 122 MMHG

## 2024-10-23 DIAGNOSIS — E55.9 VITAMIN D DEFICIENCY: ICD-10-CM

## 2024-10-23 DIAGNOSIS — G47.33 OSA (OBSTRUCTIVE SLEEP APNEA): Primary | ICD-10-CM

## 2024-10-23 DIAGNOSIS — Z23 NEED FOR INFLUENZA VACCINATION: ICD-10-CM

## 2024-10-23 PROCEDURE — 3044F HG A1C LEVEL LT 7.0%: CPT | Mod: CPTII,,, | Performed by: NURSE PRACTITIONER

## 2024-10-23 PROCEDURE — 99213 OFFICE O/P EST LOW 20 MIN: CPT | Mod: 25,,, | Performed by: NURSE PRACTITIONER

## 2024-10-23 PROCEDURE — 90471 IMMUNIZATION ADMIN: CPT | Mod: ,,, | Performed by: NURSE PRACTITIONER

## 2024-10-23 PROCEDURE — 3078F DIAST BP <80 MM HG: CPT | Mod: CPTII,,, | Performed by: NURSE PRACTITIONER

## 2024-10-23 PROCEDURE — 3074F SYST BP LT 130 MM HG: CPT | Mod: CPTII,,, | Performed by: NURSE PRACTITIONER

## 2024-10-23 PROCEDURE — 1160F RVW MEDS BY RX/DR IN RCRD: CPT | Mod: CPTII,,, | Performed by: NURSE PRACTITIONER

## 2024-10-23 PROCEDURE — 3008F BODY MASS INDEX DOCD: CPT | Mod: CPTII,,, | Performed by: NURSE PRACTITIONER

## 2024-10-23 PROCEDURE — 90656 IIV3 VACC NO PRSV 0.5 ML IM: CPT | Mod: ,,, | Performed by: NURSE PRACTITIONER

## 2024-10-23 PROCEDURE — 1159F MED LIST DOCD IN RCRD: CPT | Mod: CPTII,,, | Performed by: NURSE PRACTITIONER

## 2024-10-23 RX ORDER — FLUTICASONE PROPIONATE 50 MCG
2 SPRAY, SUSPENSION (ML) NASAL DAILY PRN
COMMUNITY
Start: 2024-10-15

## 2024-10-23 RX ORDER — ASPIRIN 325 MG
50000 TABLET, DELAYED RELEASE (ENTERIC COATED) ORAL
Qty: 12 CAPSULE | Refills: 3 | Status: SHIPPED | OUTPATIENT
Start: 2024-10-23

## 2024-10-23 NOTE — LETTER
AUTHORIZATION FOR RELEASE OF   CONFIDENTIAL INFORMATION    Dear Dr Saldivar,    We are seeing Rudy Caruso, date of birth 2000, in the clinic at [facility]. Abril Clay FNP is the patient's PCP. Rudy Caruso has an outstanding lab/procedure at the time we reviewed her chart. In order to help keep her health information updated, she has authorized us to request the following medical record(s):        (  )  MAMMOGRAM                                      (  )  COLONOSCOPY      (X)  PAP SMEAR                                          (X)  OUTSIDE LAB RESULTS     (  )  DEXA SCAN                                          (  )  EYE EXAM            (  )  FOOT EXAM                                          (  )  ENTIRE RECORD     (  )  OUTSIDE IMMUNIZATIONS                 (  )  _______________         Please fax records to [facility], [place], Kathryn F., FNP, [phone]     If you have any questions, please contact us at [phone].           Patient Name: Rudy Caruso  : 2000  Patient Phone #: [phone]

## 2024-10-23 NOTE — PROGRESS NOTES
Subjective:       Patient ID: Rudy Caruso is a 24 y.o. female.    Chief Complaint: Sleep Apnea (3m fu)      HPI   This is a 24-year-old female who presents to clinic today for three-month follow-up for sleep apnea.  Patient reports that she did the home sleep study, was diagnosed with sleep apnea, but never set up the auto PAP.  Reports that her mother convinced her not to.  Still complains of excessive daytime sleepiness, feels tired all the time, feels like her sleep is not restful.  Review of Systems  Comprehensive review of systems negative except as stated in HPI    The patient's Health Maintenance was reviewed and the following appears to be due:   There are no preventive care reminders to display for this patient.    Past Medical History:  History reviewed. No pertinent past medical history.  History reviewed. No pertinent surgical history.  Review of patient's allergies indicates:  No Known Allergies  Current Outpatient Medications on File Prior to Visit   Medication Sig Dispense Refill    ESTARYLLA 0.25-35 mg-mcg per tablet Take 1 tablet by mouth.      fluticasone propionate (FLONASE) 50 mcg/actuation nasal spray 2 sprays by Each Nostril route daily as needed for Allergies.      pantoprazole (PROTONIX) 40 MG tablet Take 1 tablet (40 mg total) by mouth once daily. 30 tablet 11    [DISCONTINUED] cholecalciferol, vitamin D3, 1,250 mcg (50,000 unit) capsule Take 1 capsule (50,000 Units total) by mouth every 7 days. (Patient not taking: Reported on 10/23/2024) 12 capsule 3     No current facility-administered medications on file prior to visit.     Social History     Socioeconomic History    Marital status: Single   Tobacco Use    Smoking status: Never    Smokeless tobacco: Never   Substance and Sexual Activity    Alcohol use: Yes     Comment: socially   Social History Narrative    ** Merged History Encounter **          Social Drivers of Health     Financial Resource Strain: Low Risk  (3/23/2024)     "Overall Financial Resource Strain (CARDIA)     Difficulty of Paying Living Expenses: Not very hard   Food Insecurity: No Food Insecurity (3/23/2024)    Hunger Vital Sign     Worried About Running Out of Food in the Last Year: Never true     Ran Out of Food in the Last Year: Never true   Transportation Needs: No Transportation Needs (3/23/2024)    PRAPARE - Transportation     Lack of Transportation (Medical): No     Lack of Transportation (Non-Medical): No   Physical Activity: Insufficiently Active (3/23/2024)    Exercise Vital Sign     Days of Exercise per Week: 2 days     Minutes of Exercise per Session: 30 min   Stress: No Stress Concern Present (3/23/2024)    [employer] - Occupational Stress Questionnaire     Feeling of Stress : Not at all   Housing Stability: Low Risk  (3/23/2024)    Housing Stability Vital Sign     Unable to Pay for Housing in the Last Year: No     Number of Places Lived in the Last Year: 1     Unstable Housing in the Last Year: No     No family history on file.    Objective:       /72 (BP Location: Left arm)   Pulse 98   Temp 97.8 °F (36.6 °C) (Oral)   Resp 16   Ht 5' 2" (1.575 m)   Wt 94.8 kg (209 lb)   SpO2 98%   BMI 38.23 kg/m²      Physical Exam  Vitals and nursing note reviewed.   Constitutional:       Appearance: Normal appearance. She is obese.   HENT:      Head: Normocephalic and atraumatic.      Right Ear: Tympanic membrane, ear canal and external ear normal.      Left Ear: Tympanic membrane, ear canal and external ear normal.      Nose: Nose normal.      Mouth/Throat:      Mouth: Mucous membranes are moist.      Pharynx: Oropharynx is clear.   Eyes:      Extraocular Movements: Extraocular movements intact.      Conjunctiva/sclera: Conjunctivae normal.      Pupils: Pupils are equal, round, and reactive to light.   Cardiovascular:      Rate and Rhythm: Normal rate and regular rhythm.      Heart sounds: Normal heart sounds.   Pulmonary:      " Effort: Pulmonary effort is normal.      Breath sounds: Normal breath sounds.   Musculoskeletal:         General: Normal range of motion.      Cervical back: Normal range of motion and neck supple.   Skin:     General: Skin is warm and dry.   Neurological:      General: No focal deficit present.      Mental Status: She is alert and oriented to person, place, and time.   Psychiatric:         Mood and Affect: Mood normal.         Behavior: Behavior normal.         Thought Content: Thought content normal.         Judgment: Judgment normal.             Assessment and Plan       ICD-10-CM ICD-9-CM   1. BREANNA (obstructive sleep apnea)  G47.33 327.23   2. Need for influenza vaccination  Z23 V04.81   3. Vitamin D deficiency  E55.9 268.9        1. BREANNA (obstructive sleep apnea)  Overview:  Home sleep study with [facility] 07/09/2024 - AHI 8.2  AutoPap 5-20 cmH20    Assessment & Plan:  Strongly encouraged to set up auto PAP therapy for reduction in excessive daytime sleepiness and snoring.  Encouraged patient to schedule three-month follow-up once she sets up auto PAP.  Patient verbalized understanding.      2. Need for influenza vaccination  -     influenza (Flulaval, Fluzone, Fluarix) 45 mcg/0.5 mL IM vaccine (> or = 6 mo) 0.5 mL    3. Vitamin D deficiency  Assessment & Plan:  Refill vitamin-D, repeat labs in June of next year as scheduled.    Orders:  -     cholecalciferol, vitamin D3, 1,250 mcg (50,000 unit) capsule; Take 1 capsule (50,000 Units total) by mouth every 7 days.  Dispense: 12 capsule; Refill: 3           Follow up in 7 months (on 6/6/2025) for Annual.

## 2024-10-23 NOTE — ASSESSMENT & PLAN NOTE
Strongly encouraged to set up auto PAP therapy for reduction in excessive daytime sleepiness and snoring.  Encouraged patient to schedule three-month follow-up once she sets up auto PAP.  Patient verbalized understanding.

## 2025-04-04 ENCOUNTER — PATIENT MESSAGE (OUTPATIENT)
Facility: CLINIC | Age: 25
End: 2025-04-04
Payer: COMMERCIAL

## 2025-04-18 DIAGNOSIS — K21.9 GASTROESOPHAGEAL REFLUX DISEASE, UNSPECIFIED WHETHER ESOPHAGITIS PRESENT: ICD-10-CM

## 2025-04-21 DIAGNOSIS — K21.9 GASTROESOPHAGEAL REFLUX DISEASE, UNSPECIFIED WHETHER ESOPHAGITIS PRESENT: ICD-10-CM

## 2025-04-21 RX ORDER — PANTOPRAZOLE SODIUM 40 MG/1
40 TABLET, DELAYED RELEASE ORAL
Qty: 30 TABLET | Refills: 11 | Status: SHIPPED | OUTPATIENT
Start: 2025-04-21 | End: 2025-04-21 | Stop reason: SDUPTHER

## 2025-04-21 RX ORDER — PANTOPRAZOLE SODIUM 40 MG/1
40 TABLET, DELAYED RELEASE ORAL DAILY
Qty: 30 TABLET | Refills: 11 | Status: SHIPPED | OUTPATIENT
Start: 2025-04-21

## 2025-07-21 ENCOUNTER — TELEPHONE (OUTPATIENT)
Dept: FAMILY MEDICINE | Facility: CLINIC | Age: 25
End: 2025-07-21
Payer: COMMERCIAL

## 2025-07-21 NOTE — TELEPHONE ENCOUNTER
Are there any outstanding tasks in patient's chart?    labs  2. Do we have outstanding/pending referrals?  n    3. Has the patient been seen in an ER, Urgent Care, or admitted since last visit?  n    4. Has patient seen any other health care providers since last visit?  n    5.  Has patient had any blood work or x-rays done since last visit?   Will complete labs before her visit

## 2025-07-21 NOTE — TELEPHONE ENCOUNTER
Copied from CRM #[id number]. Topic: General Inquiry - Patient Advice  >> Jul 21, 2025  3:54 PM Christiane wrote:  Who Called: Rudy Caruso    Caller is requesting assistance/information from provider's office.    Symptoms (please be specific):    How long has patient had these symptoms:    List of preferred pharmacies on file (remove unneeded): [unfilled]  If different, enter pharmacy into here including location and phone number:       Preferred Method of Contact: Phone Call  Patient's Preferred Phone Number on File: [phone]   Best Call Back Number, if different:  Additional Information:pt called to have labs faxed to [facility]) fax#[phone]

## 2025-07-22 LAB
% NEUTROPHILS (OHS): 62.4 (ref 42–75)
25(OH)D3+25(OH)D2 SERPL-MCNC: 80 NG/ML
ALBUMIN SERPL BCP-MCNC: 4.5 G/DL (ref 3.2–4.8)
ALBUMIN/GLOB SERPL ELPH: 2 {RATIO} (ref 1.1–2.5)
ALP SERPL-CCNC: 63 U/L (ref 46–116)
ALT SERPL-CCNC: 12 U/L (ref 10–49)
ANION GAP SERPL CALC-SCNC: 12 MMOL/L (ref 12–20)
AST SERPL-CCNC: 17 U/L
BASOPHILS NFR BLD: 0 K/UL (ref 0–0.1)
BASOPHILS NFR BLD: 0.3 % (ref 0–2)
BILIRUB SERPL-MCNC: 0.4 MG/DL (ref 0.3–1.2)
CALCIUM SERPL-MCNC: 9.8 MG/DL (ref 8.7–10.4)
CHLORIDE: 107 MMOL/L (ref 98–107)
CHOLEST SERPL-MCNC: 115 MG/DL
CHOLEST SERPL-MCNC: 194 MG/DL
CHOLEST/HDLC SERPL: 3.7 {RATIO}
CO2 SERPL-SCNC: 28 MMOL/L (ref 20–31)
CREAT/UREA NIT SERPL: 16 (ref 10–20)
CREATINE, SERUM: 0.77 (ref 0.55–1.02)
EGFR: 111.09
EOS%, CSF: 1.4 % (ref 0–5)
EOSINOPHIL NFR BLD: 0.1 K/UL (ref 0–0.7)
ERYTHROCYTE [DISTWIDTH] IN BLOOD BY AUTOMATED COUNT: 12.6 % (ref 10.2–13.4)
GLOBULIN SER-MCNC: 2.3 GM/DL (ref 2–3.5)
GLUCOSE: 98 MG/DL (ref 74–106)
HBA1C MFR BLD: 5 % (ref 4–6)
HCT VFR BLD AUTO: 41.7 % (ref 34–44)
HDLC SERPL-MCNC: 53 MG/DL
HGB BLD-MCNC: 13 G/DL (ref 12–14)
LDL/HDL RATIO: 2
LDLC SERPL CALC-MCNC: 118 MG/DL
LYMPH #: 1.5 K/UL (ref 0.5–4.1)
LYMPH%: 26.2 % (ref 21–51)
MCH RBC QN AUTO: 27.7 PG (ref 28–32)
MCHC RBC AUTO-ENTMCNC: 31.2 G/DL (ref 33–36)
MCV RBC AUTO: 88.9 FL (ref 83–97)
MONOCYTES %: 9.4 (ref 4–12)
MONOCYTES NFR BLD MANUAL: 1 % (ref 0–1)
MPC BLD CALC-MCNC: 10.8 FL (ref 7.4–10.4)
NEUTROPHILS NFR BLD: 3.6 % (ref 1.5–6.9)
PLATELET # BLD AUTO: 317 K/UL (ref 130–400)
POTASSIUM: 4.2 MMOL/L (ref 3.5–5.1)
PROT SERPL-MCNC: 6.8 G/DL (ref 5.7–8.2)
RBC # BLD AUTO: 4.69 M/UL (ref 3.8–5)
SODIUM: 143 MMOL/L (ref 136–145)
TSH SERPL DL<=0.005 MIU/L-ACNC: 2.22 UIU/ML (ref 0.55–4.78)
UREA NITROGEN (BUN): 12 MG/DL (ref 9–23)
VLDLC SERPL-MCNC: 23 MG/DL
WBC # BLD AUTO: 5.7 K/UL (ref 4–10)

## 2025-07-28 ENCOUNTER — OFFICE VISIT (OUTPATIENT)
Dept: FAMILY MEDICINE | Facility: CLINIC | Age: 25
End: 2025-07-28
Payer: COMMERCIAL

## 2025-07-28 ENCOUNTER — DOCUMENTATION ONLY (OUTPATIENT)
Dept: FAMILY MEDICINE | Facility: CLINIC | Age: 25
End: 2025-07-28

## 2025-07-28 VITALS
SYSTOLIC BLOOD PRESSURE: 110 MMHG | OXYGEN SATURATION: 99 % | HEIGHT: 62 IN | HEART RATE: 81 BPM | WEIGHT: 207 LBS | BODY MASS INDEX: 38.09 KG/M2 | RESPIRATION RATE: 16 BRPM | DIASTOLIC BLOOD PRESSURE: 77 MMHG

## 2025-07-28 DIAGNOSIS — K21.9 GASTROESOPHAGEAL REFLUX DISEASE, UNSPECIFIED WHETHER ESOPHAGITIS PRESENT: ICD-10-CM

## 2025-07-28 DIAGNOSIS — E55.9 VITAMIN D DEFICIENCY: ICD-10-CM

## 2025-07-28 DIAGNOSIS — Z13.1 SCREENING FOR DIABETES MELLITUS: ICD-10-CM

## 2025-07-28 DIAGNOSIS — Z00.00 ANNUAL PHYSICAL EXAM: Primary | ICD-10-CM

## 2025-07-28 DIAGNOSIS — E66.01 SEVERE OBESITY: ICD-10-CM

## 2025-07-28 PROCEDURE — 99395 PREV VISIT EST AGE 18-39: CPT | Mod: ,,, | Performed by: NURSE PRACTITIONER

## 2025-07-28 PROCEDURE — 1159F MED LIST DOCD IN RCRD: CPT | Mod: CPTII,,, | Performed by: NURSE PRACTITIONER

## 2025-07-28 PROCEDURE — 3008F BODY MASS INDEX DOCD: CPT | Mod: CPTII,,, | Performed by: NURSE PRACTITIONER

## 2025-07-28 PROCEDURE — 1160F RVW MEDS BY RX/DR IN RCRD: CPT | Mod: CPTII,,, | Performed by: NURSE PRACTITIONER

## 2025-07-28 PROCEDURE — 3078F DIAST BP <80 MM HG: CPT | Mod: CPTII,,, | Performed by: NURSE PRACTITIONER

## 2025-07-28 PROCEDURE — 3074F SYST BP LT 130 MM HG: CPT | Mod: CPTII,,, | Performed by: NURSE PRACTITIONER

## 2025-07-28 PROCEDURE — 3044F HG A1C LEVEL LT 7.0%: CPT | Mod: CPTII,,, | Performed by: NURSE PRACTITIONER

## 2025-07-28 NOTE — PROGRESS NOTES
"Subjective:       Patient ID: Rudy Caruso is a 25 y.o. female.    Chief Complaint: Annual Exam      History of Present Illness    CHIEF COMPLAINT:  Patient presents today for wellness visit.    EXERCISE:  She works out 3 times per week, engaging in both cardio and weight training exercises.    MEDICATIONS:  She continues pantoprazole for reflux management and vitamin D 50,000 units weekly.    LABS:  Vitamin D increased from 19 to 80. Hemoglobin A1c is 5.0. Thyroid level is 2.22. Total cholesterol decreased from 198 to 194, and LDL cholesterol decreased from 120 to 118. Chemistry panel and blood counts are normal.        Review of Systems  Comprehensive review of systems negative except as stated in HPI    The patient's Health Maintenance was reviewed and the following appears to be due:   There are no preventive care reminders to display for this patient.    Past Medical History:  History reviewed. No pertinent past medical history.  History reviewed. No pertinent surgical history.  Review of patient's allergies indicates:  No Known Allergies  Medications Ordered Prior to Encounter[1]  Social History[2]  No family history on file.    Objective:       /77 (BP Location: Left arm)   Pulse 81   Resp 16   Ht 5' 2" (1.575 m)   Wt 93.9 kg (207 lb)   LMP 07/25/2025   SpO2 99%   BMI 37.86 kg/m²      Physical Exam  Vitals and nursing note reviewed.   Constitutional:       Appearance: Normal appearance. She is obese.   HENT:      Head: Normocephalic and atraumatic.      Right Ear: Tympanic membrane, ear canal and external ear normal.      Left Ear: Tympanic membrane, ear canal and external ear normal.      Nose: Nose normal.      Mouth/Throat:      Mouth: Mucous membranes are moist.      Pharynx: Oropharynx is clear.   Eyes:      Extraocular Movements: Extraocular movements intact.      Conjunctiva/sclera: Conjunctivae normal.      Pupils: Pupils are equal, round, and reactive to light.   Cardiovascular:     "  Rate and Rhythm: Normal rate and regular rhythm.      Heart sounds: Normal heart sounds.   Pulmonary:      Effort: Pulmonary effort is normal.      Breath sounds: Normal breath sounds.   Abdominal:      General: Abdomen is flat. Bowel sounds are normal.      Palpations: Abdomen is soft.   Musculoskeletal:         General: Normal range of motion.      Cervical back: Normal range of motion and neck supple.   Skin:     General: Skin is warm and dry.   Neurological:      General: No focal deficit present.      Mental Status: She is alert and oriented to person, place, and time.   Psychiatric:         Mood and Affect: Mood normal.         Behavior: Behavior normal.         Thought Content: Thought content normal.         Judgment: Judgment normal.         Labs  Documentation Only on 07/28/2025   Component Date Value Ref Range Status    Vitamin D 07/22/2025 80  ng/mL Final    TSH 07/22/2025 2.22  0.55 - 4.78 uIU/mL Final    Cholesterol, Total 07/22/2025 194  mg/dL Final    Triglycerides 07/22/2025 115  mg/dL Final    HDL 07/22/2025 53  mg/dL Final    LDL Cholesterol 07/22/2025 118.00 (L)  mg/dL Final    Cholesterol/HDL Ratio 07/22/2025 3.7 (H)   Final    LDL/HDL Ratio 07/22/2025 2.0   Final    VLDL 07/22/2025 23  mg/dL Final    Hemoglobin A1C 07/22/2025 5.0  4.0 - 6.0 % Final    Sodium 07/22/2025 143  136 - 145 mmol/L Final    Potassium 07/22/2025 4.2  3.5 - 5.1 mmol/L Final    Chloride 07/22/2025 107  98 - 107 mmol/L Final    CO2 07/22/2025 28  20 - 31 mmol/L Final    Alkaline Phosphatase 07/22/2025 63  46 - 116 U/L Final    AST 07/22/2025 17  U/L Final    ALT 07/22/2025 12  10 - 49 U/L Final    BUN 07/22/2025 12  9 - 23 mg/dL Final    Glucose 07/22/2025 98  74 - 106 mg/dL Final    Creatine, Serum 07/22/2025 0.77  0.55 - 1.02 Final    BUN/Creatinine Ratio 07/22/2025 16  10 - 20 Final    eGFR 07/22/2025 111.09   Final    Calcium 07/22/2025 9.8  8.7 - 10.4 mg/dL Final    Total Protein 07/22/2025 6.8  5.7 - 8.2 g/dL Final     Albumin 07/22/2025 4.5  3.2 - 4.8 g/dL Final    Globulin 07/22/2025 2.3  2.0 - 3.5 gm/dL Final    Albumin/Globulin Ratio 07/22/2025 2.0  1.1 - 2.5 Final    Bilirubin Total 07/22/2025 0.4  0.3 - 1.2 mg/dL Final    Anion Gap 07/22/2025 12  12 - 20 mmol/L Final    WBC 07/22/2025 5.7  4.0 - 10.0 K/uL Final    RBC 07/22/2025 4.69  3.80 - 5.00 M/uL Final    Hemoglobin 07/22/2025 13.0  12.0 - 14.0 g/dL Final    Hematocrit 07/22/2025 41.7  34.0 - 44.0 % Final    Platelets 07/22/2025 317  130 - 400 K/uL Final    MCV 07/22/2025 88.9  83.0 - 97.0 fL Final    MCH 07/22/2025 27.7 (A)  28.0 - 32.0 pg Final    MCHC 07/22/2025 31.2 (A)  33.0 - 36.0 g/dL Final    RDW 07/22/2025 12.6  10.2 - 13.4 % Final    MPV 07/22/2025 10.8 (A)  7.4 - 10.4 fL Final    % NEUTROPHILS 07/22/2025 62.4  42.0 - 75.0 Final    Lymph % 07/22/2025 26.2  21.0 - 51.0 % Final    Monocytes % 07/22/2025 9.4  4.0 - 12.0 Final    Eosinophils, CSF 07/22/2025 1.4  0.0 - 5.0 % Final    Basophil % 07/22/2025 0.3  0.0 - 2.0 % Final    Neutrophils 07/22/2025 3.6  1.5 - 6.9 Final    Lymph # 07/22/2025 1.5  0.5 - 4.1 K/uL Final    Monocytes 07/22/2025 1  0 - 1 Final    Eos # 07/22/2025 0.1  0.0 - 0.7 K/uL Final    Baso # 07/22/2025 0.0  0.0 - 0.1 K/uL Final   Patient Message on 04/04/2025   Component Date Value Ref Range Status    PAP Recommendation External 02/26/2024 No follow-up frequency specified   Final    Pap 02/26/2024 Negative for intraephithelial lesion or malignancy  Negative for intraephithelial lesion or malignancy, Other Final       Assessment and Plan     Assessment & Plan    E66.01 Severe obesity  Z00.00 Annual physical exam  E55.9 Vitamin D deficiency  Z13.1 Screening for diabetes mellitus  K21.9 Gastroesophageal reflux disease, unspecified whether esophagitis present    IMPRESSION:  - Vitamin D increased from 19 to 80 with 50,000 units weekly supplementation, now will decrease to 50,000 units every other week.  - Hemoglobin A1c normal at 5, indicating  no diabetes or prediabetes.  - Thyroid function, LDL cholesterol, chemistries, and blood counts all WNL.  - Weight stable at 207 lbs, slight increase from 205 lbs a year ago.  - Discussed sleep apnea and previous sleep study results.    SEVERE OBESITY:  - Patient to continue current exercise regimen of cardio and weights at least 3 times per week.    ANNUAL PHYSICAL EXAM:  - Follow up in 1 year for annual wellness visit.    GASTROESOPHAGEAL REFLUX DISEASE, UNSPECIFIED WHETHER ESOPHAGITIS PRESENT:  - Continued pantoprazole.              1. Annual physical exam  Overview:  Annual wellness exam yearly in July    Orders:  -     CBC Auto Differential; Future; Expected date: 07/28/2026  -     Comprehensive Metabolic Panel; Future; Expected date: 07/28/2026  -     Lipid Panel; Future; Expected date: 07/28/2026  -     TSH; Future; Expected date: 07/28/2026  -     Hemoglobin A1C; Future; Expected date: 07/28/2026    2. Vitamin D deficiency  Assessment & Plan:  - Vitamin D increased from 19 to 80 with 50,000 units weekly supplementation, now will decrease to 50,000 units every other week.    Orders:  -     Vitamin D; Future; Expected date: 07/28/2026    3. Severe obesity  Assessment & Plan:  - Patient to continue current exercise regimen of cardio and weights at least 3 times per week.    Orders:  -     Hemoglobin A1C; Future; Expected date: 07/28/2026    4. Screening for diabetes mellitus  -     Hemoglobin A1C; Future; Expected date: 07/28/2026    5. Gastroesophageal reflux disease, unspecified whether esophagitis present  Overview:  03/26/2024 - trial of Protonix 40 mg daily             Follow up in about 1 year (around 7/28/2026) for Annual.     This note was generated with the assistance of ambient listening technology. Verbal consent was obtained by the patient and accompanying visitor(s) for the recording of patient appointment to facilitate this note. I attest to having reviewed and edited the generated note for  accuracy, though some syntax or spelling errors may persist. Please contact the author of this note for any clarification.            [1]   Current Outpatient Medications on File Prior to Visit   Medication Sig Dispense Refill    cholecalciferol, vitamin D3, 1,250 mcg (50,000 unit) capsule Take 1 capsule (50,000 Units total) by mouth every 7 days. 12 capsule 3    ESTARYLLA 0.25-35 mg-mcg per tablet Take 1 tablet by mouth.      fluticasone propionate (FLONASE) 50 mcg/actuation nasal spray 2 sprays by Each Nostril route daily as needed for Allergies.      pantoprazole (PROTONIX) 40 MG tablet Take 1 tablet (40 mg total) by mouth once daily. 30 tablet 11     No current facility-administered medications on file prior to visit.   [2]   Social History  Socioeconomic History    Marital status: Single   Tobacco Use    Smoking status: Never    Smokeless tobacco: Never   Substance and Sexual Activity    Alcohol use: Yes     Comment: socially   Social History Narrative    ** Merged History Encounter **          Social Drivers of Health     Financial Resource Strain: Low Risk  (7/27/2025)    Overall Financial Resource Strain (CARDIA)     Difficulty of Paying Living Expenses: Not hard at all   Food Insecurity: No Food Insecurity (7/27/2025)    Hunger Vital Sign     Worried About Running Out of Food in the Last Year: Never true     Ran Out of Food in the Last Year: Never true   Transportation Needs: No Transportation Needs (7/27/2025)    PRAPARE - Transportation     Lack of Transportation (Medical): No     Lack of Transportation (Non-Medical): No   Physical Activity: Sufficiently Active (7/27/2025)    Exercise Vital Sign     Days of Exercise per Week: 4 days     Minutes of Exercise per Session: 60 min   Stress: No Stress Concern Present (7/27/2025)    [employer] - Occupational Stress Questionnaire     Feeling of Stress : Not at all   Housing Stability: Low Risk  (7/27/2025)    Housing Stability Vital  Sign     Unable to Pay for Housing in the Last Year: No     Number of Times Moved in the Last Year: 0     Homeless in the Last Year: No

## 2025-07-28 NOTE — ASSESSMENT & PLAN NOTE
- Vitamin D increased from 19 to 80 with 50,000 units weekly supplementation, now will decrease to 50,000 units every other week.

## 2025-07-28 NOTE — LETTER
AUTHORIZATION FOR RELEASE OF   CONFIDENTIAL INFORMATION    Dear [facility],    We are seeing Rudy Caruso, date of birth 2000, in the clinic at [facility]. Abril Clay FNP is the patient's PCP. Rudy Caruso has an outstanding lab/procedure at the time we reviewed her chart. In order to help keep her health information updated, she has authorized us to request the following medical record(s):        (  )  MAMMOGRAM                                      (  )  COLONOSCOPY      (  )  PAP SMEAR                                          (X)  MOST RECENT LAB RESULTS     (  )  DEXA SCAN                                          (  )  EYE EXAM            (  )  FOOT EXAM                                          (  )  ENTIRE RECORD     (  )  OUTSIDE IMMUNIZATIONS                 (  )  _______________         Please fax records to [facility], [place], Kathryn F., FNP, [phone]     If you have any questions, please contact us at [phone].           Patient Name: Rudy Caruso  : 2000  Patient Phone #: [phone]